# Patient Record
Sex: MALE | Race: BLACK OR AFRICAN AMERICAN | NOT HISPANIC OR LATINO | URBAN - METROPOLITAN AREA
[De-identification: names, ages, dates, MRNs, and addresses within clinical notes are randomized per-mention and may not be internally consistent; named-entity substitution may affect disease eponyms.]

---

## 2019-10-28 ENCOUNTER — EMERGENCY (EMERGENCY)
Facility: HOSPITAL | Age: 74
LOS: 1 days | Discharge: HOME | End: 2019-10-28
Admitting: EMERGENCY MEDICINE
Payer: COMMERCIAL

## 2019-10-28 VITALS
SYSTOLIC BLOOD PRESSURE: 181 MMHG | RESPIRATION RATE: 16 BRPM | TEMPERATURE: 98 F | DIASTOLIC BLOOD PRESSURE: 75 MMHG | HEART RATE: 79 BPM | OXYGEN SATURATION: 100 %

## 2019-10-28 PROCEDURE — 73502 X-RAY EXAM HIP UNI 2-3 VIEWS: CPT | Mod: 26,RT

## 2019-10-28 PROCEDURE — 72100 X-RAY EXAM L-S SPINE 2/3 VWS: CPT | Mod: 26

## 2019-10-28 PROCEDURE — 99283 EMERGENCY DEPT VISIT LOW MDM: CPT

## 2019-10-28 RX ORDER — LIDOCAINE 4 G/100G
1 CREAM TOPICAL ONCE
Refills: 0 | Status: COMPLETED | OUTPATIENT
Start: 2019-10-28 | End: 2019-10-28

## 2019-10-28 NOTE — ED PROVIDER NOTE - PHYSICAL EXAMINATION
back- no midline tenderness. + R paraspinal tenderness in SI region. straight leg raise neg. 5/5 muscle strength in b/l LE. distal sensation intact.

## 2019-10-28 NOTE — ED PROVIDER NOTE - CLINICAL SUMMARY MEDICAL DECISION MAKING FREE TEXT BOX
73 y/o male c/o R lower back pain x4 days after walking up and down stairs- likely lumbosacral strain- will check xray, pt took motrin this morning, will dc with muscle relaxer

## 2019-10-28 NOTE — ED PROVIDER NOTE - OBJECTIVE STATEMENT
73 y/o male no pmh c/o R hip/lower back pain x4 days. Pt admits to being at work and climbing a few flights of stairs during a fire drill. Pt states that later that day he had R lower back pain, worse with standing and walking, relief with sitting. Pt admits to taking motrin and tylenol all weekend with little relief. Pt states that today the pain is still persisting so presented to the ER. Pt denies fall, injury or trauma. Denies chest pain, sob, abd pain, bowel or bladder incontinence, numbness, tingling, weakness, fever or chills.

## 2019-10-28 NOTE — ED PROVIDER NOTE - PATIENT PORTAL LINK FT
You can access the FollowMyHealth Patient Portal offered by [facility] by registering at the following website: [email or web address]. By joining [employer]’s FollowMyHealth portal, you will also be able to view your health information using other applications (apps) compatible with our system.

## 2019-10-31 ENCOUNTER — APPOINTMENT (OUTPATIENT)
Dept: ORTHOPEDIC SURGERY | Facility: CLINIC | Age: 74
End: 2019-10-31

## 2019-10-31 ENCOUNTER — APPOINTMENT (OUTPATIENT)
Dept: ORTHOPEDIC SURGERY | Facility: CLINIC | Age: 74
End: 2019-10-31
Payer: COMMERCIAL

## 2019-10-31 VITALS
HEIGHT: 72 IN | SYSTOLIC BLOOD PRESSURE: 178 MMHG | HEART RATE: 65 BPM | DIASTOLIC BLOOD PRESSURE: 75 MMHG | WEIGHT: 210 LBS | BODY MASS INDEX: 28.44 KG/M2

## 2019-10-31 DIAGNOSIS — M62.830 MUSCLE SPASM OF BACK: ICD-10-CM

## 2019-10-31 DIAGNOSIS — Z78.9 OTHER SPECIFIED HEALTH STATUS: ICD-10-CM

## 2019-10-31 DIAGNOSIS — M47.816 SPONDYLOSIS W/OUT MYELOPATHY OR RADICULOPATHY, LUMBAR REGION: ICD-10-CM

## 2019-10-31 PROCEDURE — 99204 OFFICE O/P NEW MOD 45 MIN: CPT

## 2019-10-31 RX ORDER — GABAPENTIN 100 MG/1
100 CAPSULE ORAL 3 TIMES DAILY
Qty: 90 | Refills: 0 | Status: ACTIVE | COMMUNITY
Start: 2019-10-31 | End: 1900-01-01

## 2019-10-31 RX ORDER — DICLOFENAC SODIUM 75 MG/1
75 TABLET, DELAYED RELEASE ORAL
Qty: 1 | Refills: 1 | Status: ACTIVE | COMMUNITY
Start: 2019-10-31 | End: 1900-01-01

## 2019-11-04 ENCOUNTER — FORM ENCOUNTER (OUTPATIENT)
Age: 74
End: 2019-11-04

## 2019-11-05 ENCOUNTER — APPOINTMENT (OUTPATIENT)
Dept: MRI IMAGING | Facility: CLINIC | Age: 74
End: 2019-11-05
Payer: COMMERCIAL

## 2019-11-05 ENCOUNTER — OUTPATIENT (OUTPATIENT)
Dept: OUTPATIENT SERVICES | Facility: HOSPITAL | Age: 74
LOS: 1 days | End: 2019-11-05
Payer: COMMERCIAL

## 2019-11-05 DIAGNOSIS — Z00.8 ENCOUNTER FOR OTHER GENERAL EXAMINATION: ICD-10-CM

## 2019-11-05 PROCEDURE — 72148 MRI LUMBAR SPINE W/O DYE: CPT | Mod: 26

## 2019-11-05 PROCEDURE — 72148 MRI LUMBAR SPINE W/O DYE: CPT

## 2019-11-07 PROBLEM — Z78.9 DENIES ALCOHOL CONSUMPTION: Status: ACTIVE | Noted: 2019-10-31

## 2019-11-07 PROBLEM — Z78.9 CURRENT NON-SMOKER: Status: ACTIVE | Noted: 2019-10-31

## 2019-11-07 PROBLEM — Z78.9 DOES NOT USE ILLICIT DRUGS: Status: ACTIVE | Noted: 2019-10-31

## 2019-11-07 RX ORDER — CYCLOBENZAPRINE HYDROCHLORIDE 5 MG/1
5 TABLET, FILM COATED ORAL 3 TIMES DAILY
Qty: 60 | Refills: 0 | Status: ACTIVE | COMMUNITY
Start: 2019-10-31

## 2019-11-07 RX ORDER — ACETAMINOPHEN 325 MG/1
TABLET, FILM COATED ORAL
Refills: 0 | Status: ACTIVE | COMMUNITY

## 2019-11-07 RX ORDER — IBUPROFEN 800 MG
TABLET ORAL
Refills: 0 | Status: ACTIVE | COMMUNITY

## 2019-11-07 NOTE — PHYSICAL EXAM
[de-identified] : On general examination the patient is adequately groomed and nourished. The vital parameters are as recorded. \par There is no lymphedema or diffuse swelling, no varicose veins, no skin warmth/erythema/scars/swelling, no ulcers and no palpable lymph nodes or masses in both lower extremities. Bilateral pedal pulses are well palpable.\par Upper Extremity:\par Both right and left upper extremities are unremarkable in terms of skin rash, lesions, pigmentation, redness, tenderness, swelling, joint instability, abnormal deformity or crepitus. The overall range of motion, sensation, motor tone and strength testing are normal.\par \par Hip Exam:\par The gait and station is normal\par The patient has equal leg lengths and no pelvic tilt. Alfonso/Abril test is 7 inches on the right and 7 inches on the left. Active SLR is 60 degrees on the right and 60 degrees on the left. Both hips demonstrate no scars and the skin has no signs of inflammation or tenderness. \par Both Hips have a normal range of motion of flexion to 100 degrees, abduction 40 degrees, adduction 20 degrees, external rotation 40 degrees, internal rotation 20 degrees with symmetrical motion in flexion and extension. There is no flexion contracture, deformity or instability. Labral impingement tests are negative.\par Both hips flexor, abductor and extensor power is normal.\par \par Spine Exam:\par There is mild curvature of the spine with loss of normal lumbar lordosis. The skin is devoid of erythema, scars, pigmentation or rashes. There is mild lumbar spasm and tenderness especially at L4-L5 or L5-S1. \par The range of motion of the lumbar spine is limited by pain and spasm. Forward flexion is 80% normal, extension catch positive, lateral flexion and rotation 80% normal. There is no lumbar spine imbalance or instability detected.\par Bilateral passive SLR is right 40 degrees, left 40 degrees in supine and sitting positions. Lasegue's Test is negative.\par Neurology:\par The patient is alert and oriented in person, place and time. The mood is calm and affect is normal.\par Testing for coordination including Rhomberg's Test and Finger-Nose Test, sensation, motor tone and power and deep tendon reflexes in both lower extremities is normal.\par  [de-identified] : radiographs reviewed taken at [facility]\par Radiographs of the right hip are within normal limits. \par Radiographs of the lumbar spine reveal loss of normal lordosis, consistent with lumbar spasm. \par There were no imaging taken today

## 2019-11-07 NOTE — HISTORY OF PRESENT ILLNESS
[de-identified] : Mr. NORA JOSHI is a 74 year old male presents with right hip and right leg pain since onset on 10/25/2019.  He denies fall injury or trauma, but notes he developed severe pain to the right hip, sharp and achy in character, that was unresponsive to medications and worsened enough to bring him to the ED on 10/28. He notes he was climbing a few flights of stairs during a fire drill while at work when the pain started.  He notes he has now intermittent pain to the right lateral hip, with radiation down the right lateral leg, into the right posterior calf. He notes he has pain when walking, and laying down supine. He notes he has relief when using heat, ice and nsaids. He presents for evaluation. \par He denies any recent physical therapy or chiropractic therapy for this condition. He was discharged from the ED with a diagnosis of lumbar spine strain and given a muscle relaxer. \par PMHX: Pilar cyst, excision. Prior right leg fracture fixation 2005. He denies family history of any musculoskeletal conditions. Admits to an allergy to shellfish. Denies drug allergy.  [Worsening] : worsening [8] : a current pain level of 8/10 [Intermit.] : ~He/She~ states the symptoms seem to be intermittent [Walking] : worsened by walking [Rest] : relieved by rest

## 2019-11-07 NOTE — DISCUSSION/SUMMARY
[de-identified] : Lumbar spine spasm with radicular symptoms. \par I have recommended an MRI of the lumbar spine to rule out HNP.\par A prescription for non-steroidal anti-inflammatory medication - diclofenac, as well as Flexeril for muscle spasm, and gabapentin for nerve pain, was provided and the risks, benefits and side effects were discussed. \par I have provided the patient with a referral to Dr. Vazquez for evaluation of the lumbar spine following his MRI. \par Follow up has been recommended as needed. \par

## 2019-11-18 ENCOUNTER — APPOINTMENT (OUTPATIENT)
Dept: ORTHOPEDIC SURGERY | Facility: CLINIC | Age: 74
End: 2019-11-18
Payer: COMMERCIAL

## 2019-11-18 VITALS
HEIGHT: 72 IN | BODY MASS INDEX: 28.44 KG/M2 | DIASTOLIC BLOOD PRESSURE: 94 MMHG | SYSTOLIC BLOOD PRESSURE: 139 MMHG | WEIGHT: 210 LBS | HEART RATE: 72 BPM

## 2019-11-18 DIAGNOSIS — M54.5 LOW BACK PAIN: ICD-10-CM

## 2019-11-18 PROCEDURE — 99214 OFFICE O/P EST MOD 30 MIN: CPT

## 2019-11-18 NOTE — DISCUSSION/SUMMARY
[de-identified] : Discussed nonsurgical and surgical options. Overall he feels he is improving. He will try some home exercises and physical therapy. Should his symptoms change or worsen he will let me know.

## 2019-11-18 NOTE — PHYSICAL EXAM
[Cane] : ambulates with cane [de-identified] : Examination of the lumbar spine reveals no midline tenderness palpation, step-offs, or skin lesions. Decreased range of motion with respect to flexion, extension, lateral bending, and rotation. No tenderness to palpation of the sciatic notch. No tenderness palpation of the bilateral greater trochanters. No pain with passive internal/external rotation of the hips. No instability of bilateral lower extremities.  Negative GILSON. Negative straight leg raise bilaterally. No bowstring. Negative femoral stretch. I was able to straight the exception of some trace weakness of his right foot dorsiflexor. Grossly intact sensation to light touch bilateral lower extremities. 1+ patellar and Achilles reflexes. Downgoing Babinski. No clonus. Intact proprioception. Palpable pulses. No skin lesion and no edema on the right and left lower extremities. [de-identified] : Reviewed lumbar MRI ([facility]) from 11/19 which demonstrates moderate to severe stenosis from L2-S1

## 2019-11-18 NOTE — HISTORY OF PRESENT ILLNESS
[de-identified] : Mr. NORA JOSHI  is a 74 year old male who presents with back pain since 10/28/19 without any specific cause or trauma.  He gets pain with walking and standing.  He feels better using a cart and flexing forward.  He can get pain down the right leg.  Normal bowel and bladder control.   Denies any recent fevers, chills, sweats, weight loss, or infection.  He has tried nsaids, baclofen, and is taking gabapentin 100 mg 3x/day.  \par \par The patients past medical history, past surgical history, medications, allergies, and social history were reviewed by me today with the patient and documented accordingly.  In addition, the patient's family history, which is noncontributory to their visit, was also reviewed.\par

## 2019-11-21 ENCOUNTER — APPOINTMENT (OUTPATIENT)
Dept: ORTHOPEDIC SURGERY | Facility: CLINIC | Age: 74
End: 2019-11-21
Payer: COMMERCIAL

## 2019-11-21 PROCEDURE — 99213 OFFICE O/P EST LOW 20 MIN: CPT

## 2019-11-22 VITALS
WEIGHT: 210 LBS | HEART RATE: 72 BPM | HEIGHT: 72 IN | SYSTOLIC BLOOD PRESSURE: 140 MMHG | BODY MASS INDEX: 28.44 KG/M2 | DIASTOLIC BLOOD PRESSURE: 88 MMHG

## 2019-11-22 NOTE — HISTORY OF PRESENT ILLNESS
[Intermit.] : ~He/She~ states the symptoms seem to be intermittent [Walking] : worsened by walking [Rest] : relieved by rest [de-identified] : Mr. NORA JOSHI is a 74 year old male presents with right hip and right leg pain since onset on 10/25/2019.  He denies fall injury or trauma, but notes he developed severe pain to the right hip, sharp and achy in character, that was unresponsive to medications and worsened enough to bring him to the ED on 10/28. He notes he was climbing a few flights of stairs during a fire drill while at work when the pain started.  He notes he has now intermittent pain to the right lateral hip, with radiation down the right lateral leg, into the right posterior calf. He notes he has pain when walking, and laying down supine. He notes he has relief when using heat, ice and nsaids. \par Since his last visit, he has been evaluated by Dr. Vazquez and diagnosed with stenosis L2-S1, which has been contributing to his pain.  He has been taking advil, diclofenac, Flexeril or Baclofen, with some relief. He has been out of work, and is due to return on the 25th. \par He notes he is still having some pain when walking longer distances and standing for long periods of time, but notes he is overall improved. \par PMHX: Pilar cyst, excision. Prior right leg fracture fixation 2005. He denies family history of any musculoskeletal conditions. Admits to an allergy to shellfish. Denies drug allergy.  [Improving] : improving [2] : a current pain level of 2/10

## 2019-11-22 NOTE — DISCUSSION/SUMMARY
[de-identified] : Lumbar spine spasm with lumbosacral stenosis. \par At this time he has noted improvement in symptoms. He has been recommended for conservative management by Dr. Vazquez, and will continue with his recommendations for physical therapy and use of NSAIDs, muscle relaxers as needed. He notes he has not started PT to date, and has been provided with another prescription. \par We discussed that there is no further follow up needed with me, as his symptoms are stemming from his lower back, not the hip.  He has been provided with one last refill of gabapentin and Baclofen, and has been recommended to follow up with Dr. Vazquez for discussion of continuation of these medications if needed. He is cleared to return to work. \par He will follow up as needed.

## 2019-11-22 NOTE — REASON FOR VISIT
[Initial Visit] : an initial visit for [Follow-Up Visit] : a follow-up visit for [FreeTextEntry2] : lower back pain and right hip pain.

## 2019-11-22 NOTE — PHYSICAL EXAM
[Normal] : Gait: normal [LE] : Sensory: Intact in bilateral lower extremities [Knee] : patellar 2+ and symmetric bilaterally [Ankle] : ankle 2+ and symmetric bilaterally [DP] : dorsalis pedis 2+ and symmetric bilaterally [PT] : posterior tibial 2+ and symmetric bilaterally [de-identified] : On general examination the patient is adequately groomed and nourished. The vital parameters are as recorded. \par There is no lymphedema or diffuse swelling, no varicose veins, no skin warmth/erythema/scars/swelling, no ulcers and no palpable lymph nodes or masses in both lower extremities. Bilateral pedal pulses are well palpable.\par Upper Extremity:\par Both right and left upper extremities are unremarkable in terms of skin rash, lesions, pigmentation, redness, tenderness, swelling, joint instability, abnormal deformity or crepitus. The overall range of motion, sensation, motor tone and strength testing are normal.\par \par Hip Exam:\par The gait and station is normal\par The patient has equal leg lengths and no pelvic tilt. Alfonso/Abril test is 7 inches on the right and 7 inches on the left. Active SLR is 60 degrees on the right and 60 degrees on the left. Both hips demonstrate no scars and the skin has no signs of inflammation or tenderness. \par Both Hips have a normal range of motion of flexion to 100 degrees, abduction 40 degrees, adduction 20 degrees, external rotation 40 degrees, internal rotation 20 degrees with symmetrical motion in flexion and extension. There is no flexion contracture, deformity or instability. Labral impingement tests are negative.\par Both hips flexor, abductor and extensor power is normal.\par \par Spine Exam:\par There is mild curvature of the spine with loss of normal lumbar lordosis. The skin is devoid of erythema, scars, pigmentation or rashes. There is mild lumbar spasm and tenderness especially at L4-L5 or L5-S1. \par The range of motion of the lumbar spine is limited by pain and spasm. Forward flexion is 80% normal, extension catch positive, lateral flexion and rotation 80% normal. There is no lumbar spine imbalance or instability detected.\par Bilateral passive SLR is right 40 degrees, left 40 degrees in supine and sitting positions. Lasegue's Test is negative.\par Neurology:\par The patient is alert and oriented in person, place and time. The mood is calm and affect is normal.\par Testing for coordination including Rhomberg's Test and Finger-Nose Test, sensation, motor tone and power and deep tendon reflexes in both lower extremities is normal.\par  [de-identified] : radiographs reviewed taken at [facility]\par Radiographs of the right hip are within normal limits. \par Radiographs of the lumbar spine reveal loss of normal lordosis, consistent with lumbar spasm. \par There were no imaging taken today\par \par Lumbar spine MRI reviewed, reveals stenosis L2-S1.

## 2019-12-18 ENCOUNTER — APPOINTMENT (OUTPATIENT)
Dept: ORTHOPEDIC SURGERY | Facility: CLINIC | Age: 74
End: 2019-12-18
Payer: COMMERCIAL

## 2019-12-18 PROCEDURE — 99214 OFFICE O/P EST MOD 30 MIN: CPT

## 2019-12-18 NOTE — HISTORY OF PRESENT ILLNESS
[de-identified] : Mr. NORA JOSHI  is a 74 year old male who presents to the office for a follow-up viist.  Overall he is feeling better.  He has been doing a home exercise program., not physical therapy. \par

## 2019-12-18 NOTE — PHYSICAL EXAM
[Cane] : ambulates with cane [de-identified] : Examination of the lumbar spine reveals no midline tenderness palpation, step-offs, or skin lesions. Decreased range of motion with respect to flexion, extension, lateral bending, and rotation. No tenderness to palpation of the sciatic notch. No tenderness palpation of the bilateral greater trochanters. No pain with passive internal/external rotation of the hips. No instability of bilateral lower extremities.  Negative GILSON. Negative straight leg raise bilaterally. No bowstring. Negative femoral stretch. I was able to straight the exception of some trace weakness of his right foot dorsiflexor. Grossly intact sensation to light touch bilateral lower extremities. 1+ patellar and Achilles reflexes. Downgoing Babinski. No clonus. Intact proprioception. Palpable pulses. No skin lesion and no edema on the right and left lower extremities. [de-identified] : Reviewed lumbar MRI ([facility]) from 11/19 which demonstrates moderate to severe stenosis from L2-S1

## 2019-12-18 NOTE — DISCUSSION/SUMMARY
[de-identified] : Pain is much approved. His strength also seems to be slightly improved compared to prior visit. We discussed nonsurgical and surgical options. He wished to continue with nonsurgical treatment

## 2019-12-18 NOTE — PHYSICAL EXAM
[Cane] : ambulates with cane [de-identified] : Examination of the lumbar spine reveals no midline tenderness palpation, step-offs, or skin lesions. Decreased range of motion with respect to flexion, extension, lateral bending, and rotation. No tenderness to palpation of the sciatic notch. No tenderness palpation of the bilateral greater trochanters. No pain with passive internal/external rotation of the hips. No instability of bilateral lower extremities.  Negative GILSON. Negative straight leg raise bilaterally. No bowstring. Negative femoral stretch. I was able to straight the exception of some trace weakness of his right foot dorsiflexor. Grossly intact sensation to light touch bilateral lower extremities. 1+ patellar and Achilles reflexes. Downgoing Babinski. No clonus. Intact proprioception. Palpable pulses. No skin lesion and no edema on the right and left lower extremities. [de-identified] : Reviewed lumbar MRI ([facility]) from 11/19 which demonstrates moderate to severe stenosis from L2-S1

## 2019-12-18 NOTE — DISCUSSION/SUMMARY
[de-identified] : Pain is much approved. His strength also seems to be slightly improved compared to prior visit. We discussed nonsurgical and surgical options. He wished to continue with nonsurgical treatment

## 2019-12-18 NOTE — HISTORY OF PRESENT ILLNESS
[de-identified] : Mr. NORA JOSHI  is a 74 year old male who presents to the office for a follow-up viist.  Overall he is feeling better.  He has been doing a home exercise program., not physical therapy. \par

## 2020-02-24 ENCOUNTER — APPOINTMENT (OUTPATIENT)
Dept: ORTHOPEDIC SURGERY | Facility: CLINIC | Age: 75
End: 2020-02-24
Payer: COMMERCIAL

## 2020-02-24 DIAGNOSIS — M48.07 SPINAL STENOSIS, LUMBOSACRAL REGION: ICD-10-CM

## 2020-02-24 DIAGNOSIS — M54.16 RADICULOPATHY, LUMBAR REGION: ICD-10-CM

## 2020-02-24 PROCEDURE — 99214 OFFICE O/P EST MOD 30 MIN: CPT

## 2020-02-24 RX ORDER — GABAPENTIN 100 MG/1
100 CAPSULE ORAL 3 TIMES DAILY
Qty: 90 | Refills: 0 | Status: ACTIVE | COMMUNITY
Start: 2019-11-21 | End: 1900-01-01

## 2020-02-24 RX ORDER — BACLOFEN 10 MG/1
10 TABLET ORAL 3 TIMES DAILY
Qty: 30 | Refills: 1 | Status: ACTIVE | COMMUNITY
Start: 2019-11-21 | End: 1900-01-01

## 2020-02-24 NOTE — DISCUSSION/SUMMARY
[de-identified] : We discussed nonsurgical and surgical options. Overall he is improved. She will continue with nonsurgical treatment. He will let me know of any changes or worsening of the symptoms.

## 2020-02-24 NOTE — HISTORY OF PRESENT ILLNESS
[de-identified] : Mr. NORA JOSHI  is a 74 year old male who presents to the office for a follow-up viist.  He presents with 10 days of left leg pain which is new.  His pain was severe and he could barely walk.  He has used lidocaine patches which have helped. In the past his pain was all on the right side. \par

## 2020-02-24 NOTE — PHYSICAL EXAM
[Cane] : ambulates with cane [de-identified] : Examination of the lumbar spine reveals no midline tenderness palpation, step-offs, or skin lesions. Decreased range of motion with respect to flexion, extension, lateral bending, and rotation. No tenderness to palpation of the sciatic notch. No tenderness palpation of the bilateral greater trochanters. No pain with passive internal/external rotation of the hips. No instability of bilateral lower extremities.  Negative GILSON. Negative straight leg raise bilaterally. No bowstring. Negative femoral stretch. grossly intact strength. Grossly intact sensation to light touch bilateral lower extremities. 1+ patellar and Achilles reflexes. Downgoing Babinski. No clonus. Intact proprioception. Palpable pulses. No skin lesion and no edema on the right and left lower extremities. [de-identified] : Reviewed lumbar MRI ([facility]) from 11/19 which demonstrates moderate to severe stenosis from L2-S1

## 2023-07-12 ENCOUNTER — EMERGENCY (EMERGENCY)
Facility: HOSPITAL | Age: 78
LOS: 1 days | Discharge: HOME | End: 2023-07-12
Attending: EMERGENCY MEDICINE | Admitting: STUDENT IN AN ORGANIZED HEALTH CARE EDUCATION/TRAINING PROGRAM
Payer: COMMERCIAL

## 2023-07-12 VITALS
OXYGEN SATURATION: 99 % | DIASTOLIC BLOOD PRESSURE: 87 MMHG | SYSTOLIC BLOOD PRESSURE: 181 MMHG | HEART RATE: 60 BPM | RESPIRATION RATE: 17 BRPM

## 2023-07-12 VITALS
HEART RATE: 88 BPM | SYSTOLIC BLOOD PRESSURE: 195 MMHG | RESPIRATION RATE: 18 BRPM | DIASTOLIC BLOOD PRESSURE: 110 MMHG | OXYGEN SATURATION: 100 % | TEMPERATURE: 98 F

## 2023-07-12 LAB
ALBUMIN SERPL ELPH-MCNC: 4.5 G/DL — SIGNIFICANT CHANGE UP (ref 3.3–5)
ALP SERPL-CCNC: 46 U/L — SIGNIFICANT CHANGE UP (ref 40–120)
ALT FLD-CCNC: 32 U/L — SIGNIFICANT CHANGE UP (ref 4–41)
ANION GAP SERPL CALC-SCNC: 11 MMOL/L — SIGNIFICANT CHANGE UP (ref 7–14)
AST SERPL-CCNC: 39 U/L — SIGNIFICANT CHANGE UP (ref 4–40)
BASOPHILS # BLD AUTO: 0.03 K/UL — SIGNIFICANT CHANGE UP (ref 0–0.2)
BASOPHILS NFR BLD AUTO: 0.5 % — SIGNIFICANT CHANGE UP (ref 0–2)
BILIRUB SERPL-MCNC: 0.5 MG/DL — SIGNIFICANT CHANGE UP (ref 0.2–1.2)
BUN SERPL-MCNC: 14 MG/DL — SIGNIFICANT CHANGE UP (ref 7–23)
CALCIUM SERPL-MCNC: 9.5 MG/DL — SIGNIFICANT CHANGE UP (ref 8.4–10.5)
CHLORIDE SERPL-SCNC: 106 MMOL/L — SIGNIFICANT CHANGE UP (ref 98–107)
CO2 SERPL-SCNC: 24 MMOL/L — SIGNIFICANT CHANGE UP (ref 22–31)
CREAT SERPL-MCNC: 0.94 MG/DL — SIGNIFICANT CHANGE UP (ref 0.5–1.3)
EGFR: 83 ML/MIN/1.73M2 — SIGNIFICANT CHANGE UP
EOSINOPHIL # BLD AUTO: 0.05 K/UL — SIGNIFICANT CHANGE UP (ref 0–0.5)
EOSINOPHIL NFR BLD AUTO: 0.8 % — SIGNIFICANT CHANGE UP (ref 0–6)
GLUCOSE SERPL-MCNC: 166 MG/DL — HIGH (ref 70–99)
HCT VFR BLD CALC: 41.2 % — SIGNIFICANT CHANGE UP (ref 39–50)
HGB BLD-MCNC: 13.6 G/DL — SIGNIFICANT CHANGE UP (ref 13–17)
IANC: 3.32 K/UL — SIGNIFICANT CHANGE UP (ref 1.8–7.4)
IMM GRANULOCYTES NFR BLD AUTO: 0.3 % — SIGNIFICANT CHANGE UP (ref 0–0.9)
LYMPHOCYTES # BLD AUTO: 2.07 K/UL — SIGNIFICANT CHANGE UP (ref 1–3.3)
LYMPHOCYTES # BLD AUTO: 34 % — SIGNIFICANT CHANGE UP (ref 13–44)
MCHC RBC-ENTMCNC: 29.1 PG — SIGNIFICANT CHANGE UP (ref 27–34)
MCHC RBC-ENTMCNC: 33 GM/DL — SIGNIFICANT CHANGE UP (ref 32–36)
MCV RBC AUTO: 88 FL — SIGNIFICANT CHANGE UP (ref 80–100)
MONOCYTES # BLD AUTO: 0.6 K/UL — SIGNIFICANT CHANGE UP (ref 0–0.9)
MONOCYTES NFR BLD AUTO: 9.9 % — SIGNIFICANT CHANGE UP (ref 2–14)
NEUTROPHILS # BLD AUTO: 3.32 K/UL — SIGNIFICANT CHANGE UP (ref 1.8–7.4)
NEUTROPHILS NFR BLD AUTO: 54.5 % — SIGNIFICANT CHANGE UP (ref 43–77)
NRBC # BLD: 0 /100 WBCS — SIGNIFICANT CHANGE UP (ref 0–0)
NRBC # FLD: 0 K/UL — SIGNIFICANT CHANGE UP (ref 0–0)
PLATELET # BLD AUTO: 211 K/UL — SIGNIFICANT CHANGE UP (ref 150–400)
POTASSIUM SERPL-MCNC: 4.2 MMOL/L — SIGNIFICANT CHANGE UP (ref 3.5–5.3)
POTASSIUM SERPL-SCNC: 4.2 MMOL/L — SIGNIFICANT CHANGE UP (ref 3.5–5.3)
PROT SERPL-MCNC: 6.8 G/DL — SIGNIFICANT CHANGE UP (ref 6–8.3)
RBC # BLD: 4.68 M/UL — SIGNIFICANT CHANGE UP (ref 4.2–5.8)
RBC # FLD: 14.2 % — SIGNIFICANT CHANGE UP (ref 10.3–14.5)
SODIUM SERPL-SCNC: 141 MMOL/L — SIGNIFICANT CHANGE UP (ref 135–145)
TROPONIN T, HIGH SENSITIVITY RESULT: 41 NG/L — SIGNIFICANT CHANGE UP
TROPONIN T, HIGH SENSITIVITY RESULT: 42 NG/L — SIGNIFICANT CHANGE UP
WBC # BLD: 6.09 K/UL — SIGNIFICANT CHANGE UP (ref 3.8–10.5)
WBC # FLD AUTO: 6.09 K/UL — SIGNIFICANT CHANGE UP (ref 3.8–10.5)

## 2023-07-12 PROCEDURE — 99285 EMERGENCY DEPT VISIT HI MDM: CPT

## 2023-07-12 PROCEDURE — 93010 ELECTROCARDIOGRAM REPORT: CPT

## 2023-07-12 PROCEDURE — 71046 X-RAY EXAM CHEST 2 VIEWS: CPT | Mod: 26

## 2023-07-12 RX ORDER — IBUPROFEN 200 MG
600 TABLET ORAL ONCE
Refills: 0 | Status: COMPLETED | OUTPATIENT
Start: 2023-07-12 | End: 2023-07-12

## 2023-07-12 RX ORDER — LIDOCAINE 4 G/100G
1 CREAM TOPICAL ONCE
Refills: 0 | Status: COMPLETED | OUTPATIENT
Start: 2023-07-12 | End: 2023-07-12

## 2023-07-12 RX ADMIN — LIDOCAINE 1 PATCH: 4 CREAM TOPICAL at 09:45

## 2023-07-12 RX ADMIN — Medication 600 MILLIGRAM(S): at 09:45

## 2023-07-12 NOTE — ED ADULT NURSE NOTE - NSFALLUNIVINTERV_ED_ALL_ED
Bed/Stretcher in lowest position, wheels locked, appropriate side rails in place/Call bell, personal items and telephone in reach/Instruct patient to call for assistance before getting out of bed/chair/stretcher/Non-slip footwear applied when patient is off stretcher/[place] to call system/Physically safe environment - no spills, clutter or unnecessary equipment/Purposeful proactive rounding/Room/bathroom lighting operational, light cord in reach

## 2023-07-12 NOTE — ED PROVIDER NOTE - PHYSICAL EXAMINATION
Yu Bridges MD  GENERAL: Patient awake alert NAD.  HEENT: NC/AT, Moist mucous membranes, EOMI.  LUNGS: CTAB, no wheezes or crackles. +tender to palpation at left anterior chest wall, no bruising, no rashes, no step offs  CARDIAC: RRR, no m/r/g.    ABDOMEN: Soft, NT, ND, No rebound, guarding. No CVA tenderness.   EXT: No edema. No calf tenderness.   MSK: No pain with movement, no deformities.  NEURO: A&Ox3. Moving all extremities.  SKIN: Warm and dry. No rash.  PSYCH: Normal affect.

## 2023-07-12 NOTE — ED PROVIDER NOTE - ATTENDING CONTRIBUTION TO CARE
DR. ALLISON, ATTENDING MD-  I performed a face to face bedside interview with the patient regarding history of present illness, review of symptoms and past medical history. I completed an independent physical exam.  I have discussed the patient's plan of care with the fellow.  Documentation as above in the note.    79 y/o male with l cp after heavy lifting and bumping into door two days ago.  Mild ttp over l chest wall, no skin changes, no crepitus, ctabil, no extra heart sounds.  Obtain ekg cxr cbc bmp trop give pain med reassess likely dc with pcp and cards f/u as o/p.

## 2023-07-12 NOTE — ED PROVIDER NOTE - OBJECTIVE STATEMENT
Patient is a 78-year-old male with no previous medical history presenting to the ED with chest pain.  He states that 2 days ago, he was lifting some heavy boxes when he bumped his chest into thumb.  He felt well that day, but the next day he started having pain at the site where he had bumped into the boxes.  The pain is worse with movement and is not present at rest.  Pain is worse with palpation to the site.  Pain is worse with deep breath.  Denies shortness of breath, radiation of pain, diaphoresis, palpitations.  Does not have a history of high blood pressure though his blood pressure is high in the ED, takes no medications and has no medical problems.  Last saw his doctor 2 years ago.  Has never seen a cardiologist before.  Denies any fevers, chills, nausea, vomiting, rashes.  Denies any pain in the back of his calves or swelling in his legs, no recent hospitalization or surgery, no recent long distance travel.

## 2023-07-12 NOTE — ED PROVIDER NOTE - PROGRESS NOTE DETAILS
Patient was re-evaluated and doing well. Results, including any incidental findings, were discussed. Return precautions and follow up were discussed. Patient verbalized understanding.  trop neg x2

## 2023-07-12 NOTE — ED ADULT NURSE NOTE - OBJECTIVE STATEMENT
Patient came in with the complaints of left sided chest pain started 2 days ago after hitting into the door. No other complaints. No distress noted. Awaiting for MD to see and further orders. Nursing care continues

## 2023-07-12 NOTE — ED PROVIDER NOTE - CLINICAL SUMMARY MEDICAL DECISION MAKING FREE TEXT BOX
Yuliana - Patient is a 78-year-old male with no previous medical history presenting to the ED with chest pain. ddx includes ACS vs msk vs rib fx. will check blood work, ekg, cxr, trop

## 2023-07-12 NOTE — ED PROVIDER NOTE - PATIENT PORTAL LINK FT
You can access the FollowMyHealth Patient Portal offered by [facility] by registering at the following website: [email or web address]. By joining [employer]’s FollowMyHealth portal, you will also be able to view your health information using other applications (apps) compatible with our system.

## 2023-07-12 NOTE — ED PROVIDER NOTE - NSFOLLOWUPINSTRUCTIONS_ED_ALL_ED_FT
You were seen in the ED for chest pain.      Your blood work, EKG, chest x-ray were all reassuring.     Your blood pressure was elevated. Please follow-up with your cardiologist in the next 2 to 3 days for further management.    Return to the ED if you have any new or worrisome symptoms including worsening chest pain, difficulty breathing, fevers, chills, vomiting.

## 2023-07-12 NOTE — ED PROVIDER NOTE - NSFOLLOWUPCLINICS_GEN_ALL_ED_FT
Cardiology at [facility]  Cardiology  [address] [place] [address]  Phone: [phone]  Fax:

## 2023-07-12 NOTE — ED PROVIDER NOTE - NS ED ROS FT
GENERAL: No fever, chills  EYES: no vision changes, no discharge.   ENT: no difficulty swallowing or speaking   CARDIAC: +chest pain/pressure, no SOB, lower extremity swelling  PULMONARY: no cough, SOB  GI: no abdominal pain, n/v/d  : no dysuria  SKIN: no rashes  NEURO: no headache, lightheadedness, paresthesia  MSK: No joint pain, myalgia, weakness.

## 2023-09-05 NOTE — ED ADULT TRIAGE NOTE - CCCP TRG CHIEF CMPLNT
OKAY TO REFILL    I have reviewed information from the [place] [facility] MONITORING PROGRAM:  Reports are compliant with drug, quantity, prescribe, dispenser, and recipient history.Treatment is appropriate.    LAST REFILL :none    DONE    Okay to take lyrica with thyroid medication   back spasm